# Patient Record
Sex: MALE | Race: BLACK OR AFRICAN AMERICAN | URBAN - METROPOLITAN AREA
[De-identification: names, ages, dates, MRNs, and addresses within clinical notes are randomized per-mention and may not be internally consistent; named-entity substitution may affect disease eponyms.]

---

## 2020-06-08 ENCOUNTER — OFFICE VISIT (OUTPATIENT)
Dept: URBAN - METROPOLITAN AREA CLINIC 96 | Facility: CLINIC | Age: 30
End: 2020-06-08
Payer: COMMERCIAL

## 2020-06-08 DIAGNOSIS — R74.8 ABNORMAL LIVER ENZYMES: ICD-10-CM

## 2020-06-08 DIAGNOSIS — R10.84 ABDOMINAL CRAMPING, GENERALIZED: ICD-10-CM

## 2020-06-08 PROCEDURE — 99204 OFFICE O/P NEW MOD 45 MIN: CPT | Performed by: INTERNAL MEDICINE

## 2020-06-08 RX ORDER — PANTOPRAZOLE SODIUM 20 MG/1
TAPER TABLET, DELAYED RELEASE ORAL
Qty: 30 | Refills: 1 | OUTPATIENT
Start: 2020-06-08

## 2020-06-08 RX ORDER — SUCRALFATE 1 G/1
1 TABLET ON AN EMPTY STOMACH TABLET ORAL TWICE A DAY
Status: ACTIVE | COMMUNITY

## 2020-06-08 NOTE — HPI-OTHER HISTORIES
29 y.o. AAM, works in [employer],  PCP - Don't have one Just ended up in ER; had an incident; told it was gastritis; thought maybe food poisoning Had been dealing with indgestion, gas, belching, L sided pain Syx persisted so went to ER despite Gas X, tums - both only helped a little Increased night sweats Poor diet Occ NSAIDs Bowels w/o melena Wt stable Dad w/ hx of gastritis Recent stress

## 2020-06-11 ENCOUNTER — WEB ENCOUNTER (OUTPATIENT)
Dept: URBAN - METROPOLITAN AREA CLINIC 92 | Facility: CLINIC | Age: 30
End: 2020-06-11

## 2020-06-11 LAB
A/G RATIO: 2.4
ALBUMIN: 5
ALKALINE PHOSPHATASE: 120
ALT (SGPT): 61
AST (SGOT): 70
BASO (ABSOLUTE): 0
BASOS: 1
BILIRUBIN, TOTAL: 0.9
BUN/CREATININE RATIO: 8
BUN: 7
CALCIUM: 10
CARBON DIOXIDE, TOTAL: 24
CHLORIDE: 102
CREATININE: 0.86
EGFR IF AFRICN AM: 135
EGFR IF NONAFRICN AM: 117
EOS (ABSOLUTE): 0
EOS: 0
FERRITIN, SERUM: 1006
GLOBULIN, TOTAL: 2.1
GLUCOSE: 93
H PYLORI, IGM ABS: <9
H. PYLORI, IGA ABS: <9
H. PYLORI, IGG ABS: 0.18
HBSAG SCREEN: NEGATIVE
HEMATOCRIT: 45.8
HEMATOLOGY COMMENTS:: (no result)
HEMOGLOBIN: 14.7
HEP A AB, IGM: NEGATIVE
HEP B CORE AB, IGM: NEGATIVE
HEP C VIRUS AB: <0.1
IMMATURE CELLS: (no result)
IMMATURE GRANS (ABS): 0
IMMATURE GRANULOCYTES: 0
IRON BIND.CAP.(TIBC): 310
IRON SATURATION: 27
IRON: 83
LDH: 274
LYMPHS (ABSOLUTE): 1.9
LYMPHS: 30
MCH: 27.9
MCHC: 32.1
MCV: 87
MONOCYTES(ABSOLUTE): 0.8
MONOCYTES: 12
NEUTROPHILS (ABSOLUTE): 3.5
NEUTROPHILS: 57
NRBC: (no result)
PHOSPHORUS: 3.1
PLATELETS: 176
POTASSIUM: 4.8
PROTEIN, TOTAL: 7.1
RBC: 5.26
RDW: 11.8
SODIUM: 140
UIBC: 227
WBC: 6.3

## 2020-06-16 ENCOUNTER — WEB ENCOUNTER (OUTPATIENT)
Dept: URBAN - METROPOLITAN AREA CLINIC 96 | Facility: CLINIC | Age: 30
End: 2020-06-16

## 2020-06-22 ENCOUNTER — TELEPHONE ENCOUNTER (OUTPATIENT)
Dept: URBAN - METROPOLITAN AREA CLINIC 92 | Facility: CLINIC | Age: 30
End: 2020-06-22

## 2020-06-25 ENCOUNTER — LAB OUTSIDE AN ENCOUNTER (OUTPATIENT)
Dept: URBAN - METROPOLITAN AREA CLINIC 96 | Facility: CLINIC | Age: 30
End: 2020-06-25

## 2020-06-25 LAB
ABSOLUTE BASOPHIL COUNT: 0.04
ABSOLUTE EOSINOPHIL COUNT: 0.01
ABSOLUTE IMMATURE GRANULOCYTE  COUNT: 0.02
ABSOLUTE LYMPHOCYTE COUNT: 1.78
ABSOLUTE MONOCYTE COUNT: 0.92
ABSOLUTE NEUTROPHIL COUNT (ANC): 3.69
ABSOLUTE NRBC  COUNT: 0
BASOPHIL AUTO: 1
EOS AUTO: 0
HCT: 45.3
HGB: 15.5
IMMATURE GRANULOCYTES AUTO: 0.3
INR: 1.1
IPF: 2.3
LYMPH AUTO: 28
MCH: 29.1
MCHC: 34.2
MCV: 85.2
MONO AUTO: 14
MPV: 10.7
NEUTRO AUTO: 57
NRBC AUTO: 0
PARTIAL THROMBOPLASTIN TIME: 30
PERFORMING LAB: (no result)
PLATELETS: 124
PT: 14.1
RBC: 5.32
RDW: 12.4
SLIDE REVIEW: (no result)
WBC: 6.5

## 2020-06-28 ENCOUNTER — WEB ENCOUNTER (OUTPATIENT)
Dept: URBAN - METROPOLITAN AREA CLINIC 92 | Facility: CLINIC | Age: 30
End: 2020-06-28

## 2020-06-29 ENCOUNTER — DASHBOARD ENCOUNTERS (OUTPATIENT)
Age: 30
End: 2020-06-29